# Patient Record
Sex: FEMALE | Race: WHITE | URBAN - METROPOLITAN AREA
[De-identification: names, ages, dates, MRNs, and addresses within clinical notes are randomized per-mention and may not be internally consistent; named-entity substitution may affect disease eponyms.]

---

## 2021-02-17 ENCOUNTER — APPOINTMENT (RX ONLY)
Dept: URBAN - METROPOLITAN AREA CLINIC 150 | Facility: CLINIC | Age: 71
Setting detail: DERMATOLOGY
End: 2021-02-17

## 2021-02-17 DIAGNOSIS — D485 NEOPLASM OF UNCERTAIN BEHAVIOR OF SKIN: ICD-10-CM

## 2021-02-17 DIAGNOSIS — L82.1 OTHER SEBORRHEIC KERATOSIS: ICD-10-CM

## 2021-02-17 DIAGNOSIS — L81.4 OTHER MELANIN HYPERPIGMENTATION: ICD-10-CM

## 2021-02-17 DIAGNOSIS — L57.0 ACTINIC KERATOSIS: ICD-10-CM

## 2021-02-17 PROBLEM — D48.5 NEOPLASM OF UNCERTAIN BEHAVIOR OF SKIN: Status: ACTIVE | Noted: 2021-02-17

## 2021-02-17 PROCEDURE — ? FULL BODY SKIN EXAM

## 2021-02-17 PROCEDURE — ? OTHER

## 2021-02-17 PROCEDURE — 17000 DESTRUCT PREMALG LESION: CPT

## 2021-02-17 PROCEDURE — ? COUNSELING

## 2021-02-17 PROCEDURE — ? LIQUID NITROGEN

## 2021-02-17 PROCEDURE — 17003 DESTRUCT PREMALG LES 2-14: CPT

## 2021-02-17 PROCEDURE — ? ADDITIONAL NOTES

## 2021-02-17 PROCEDURE — 99203 OFFICE O/P NEW LOW 30 MIN: CPT | Mod: 25

## 2021-02-17 ASSESSMENT — LOCATION SIMPLE DESCRIPTION DERM
LOCATION SIMPLE: LEFT POSTERIOR UPPER ARM
LOCATION SIMPLE: LEFT SUPERIOR EYELID
LOCATION SIMPLE: RIGHT LOWER BACK
LOCATION SIMPLE: RIGHT POSTERIOR UPPER ARM
LOCATION SIMPLE: UPPER BACK
LOCATION SIMPLE: CHEST
LOCATION SIMPLE: LEFT UPPER BACK

## 2021-02-17 ASSESSMENT — LOCATION DETAILED DESCRIPTION DERM
LOCATION DETAILED: SUPERIOR THORACIC SPINE
LOCATION DETAILED: LEFT SUPERIOR UPPER BACK
LOCATION DETAILED: UPPER STERNUM
LOCATION DETAILED: STERNAL NOTCH
LOCATION DETAILED: RIGHT DISTAL POSTERIOR UPPER ARM
LOCATION DETAILED: RIGHT INFERIOR MEDIAL MIDBACK
LOCATION DETAILED: MIDDLE STERNUM
LOCATION DETAILED: LEFT SUPRATARSAL CREASE
LOCATION DETAILED: LEFT DISTAL POSTERIOR UPPER ARM

## 2021-02-17 ASSESSMENT — LOCATION ZONE DERM
LOCATION ZONE: ARM
LOCATION ZONE: EYELID
LOCATION ZONE: TRUNK

## 2021-02-17 NOTE — PROCEDURE: LIQUID NITROGEN
Consent: The patient's consent was obtained including but not limited to risks of crusting, scabbing, blistering, scarring, darker or lighter pigmentary change, recurrence, incomplete removal and infection.
Render Note In Bullet Format When Appropriate: No
Post-Care Instructions: I reviewed with the patient in detail post-care instructions. Patient is to wear sunprotection, and avoid picking at any of the treated lesions. Pt may apply Vaseline to crusted or scabbing areas.
Duration Of Freeze Thaw-Cycle (Seconds): 10
Number Of Freeze-Thaw Cycles: 2 freeze-thaw cycles
Detail Level: Detailed

## 2021-02-17 NOTE — PROCEDURE: OTHER
Detail Level: Zone
Other (Free Text): Monitor area if no improvement should see 
Render Risk Assessment In Note?: no
Note Text (......Xxx Chief Complaint.): This diagnosis correlates with the

## 2022-01-24 ENCOUNTER — APPOINTMENT (RX ONLY)
Dept: URBAN - METROPOLITAN AREA CLINIC 150 | Facility: CLINIC | Age: 72
Setting detail: DERMATOLOGY
End: 2022-01-24

## 2022-01-24 DIAGNOSIS — L82.1 OTHER SEBORRHEIC KERATOSIS: ICD-10-CM

## 2022-01-24 DIAGNOSIS — L81.4 OTHER MELANIN HYPERPIGMENTATION: ICD-10-CM

## 2022-01-24 DIAGNOSIS — D18.0 HEMANGIOMA: ICD-10-CM

## 2022-01-24 DIAGNOSIS — D22 MELANOCYTIC NEVI: ICD-10-CM

## 2022-01-24 DIAGNOSIS — D485 NEOPLASM OF UNCERTAIN BEHAVIOR OF SKIN: ICD-10-CM

## 2022-01-24 DIAGNOSIS — L57.0 ACTINIC KERATOSIS: ICD-10-CM

## 2022-01-24 DIAGNOSIS — L56.8 OTHER SPECIFIED ACUTE SKIN CHANGES DUE TO ULTRAVIOLET RADIATION: ICD-10-CM

## 2022-01-24 PROBLEM — D48.5 NEOPLASM OF UNCERTAIN BEHAVIOR OF SKIN: Status: ACTIVE | Noted: 2022-01-24

## 2022-01-24 PROBLEM — D18.01 HEMANGIOMA OF SKIN AND SUBCUTANEOUS TISSUE: Status: ACTIVE | Noted: 2022-01-24

## 2022-01-24 PROBLEM — D22.9 MELANOCYTIC NEVI, UNSPECIFIED: Status: ACTIVE | Noted: 2022-01-24

## 2022-01-24 PROCEDURE — ? LIQUID NITROGEN

## 2022-01-24 PROCEDURE — 17003 DESTRUCT PREMALG LES 2-14: CPT

## 2022-01-24 PROCEDURE — ? FULL BODY SKIN EXAM

## 2022-01-24 PROCEDURE — ? ADDITIONAL NOTES

## 2022-01-24 PROCEDURE — 17000 DESTRUCT PREMALG LESION: CPT | Mod: 59

## 2022-01-24 PROCEDURE — ? BIOPSY BY SHAVE METHOD

## 2022-01-24 PROCEDURE — ? RECOMMENDATIONS

## 2022-01-24 PROCEDURE — 99213 OFFICE O/P EST LOW 20 MIN: CPT | Mod: 25

## 2022-01-24 PROCEDURE — ? TREATMENT REGIMEN

## 2022-01-24 PROCEDURE — 11102 TANGNTL BX SKIN SINGLE LES: CPT

## 2022-01-24 PROCEDURE — ? COUNSELING

## 2022-01-24 ASSESSMENT — LOCATION DETAILED DESCRIPTION DERM
LOCATION DETAILED: LEFT PROXIMAL RADIAL DORSAL FOREARM
LOCATION DETAILED: INFERIOR THORACIC SPINE
LOCATION DETAILED: LEFT POPLITEAL SKIN
LOCATION DETAILED: UPPER STERNUM
LOCATION DETAILED: RIGHT SUPERIOR LATERAL UPPER BACK
LOCATION DETAILED: RIGHT KNEE
LOCATION DETAILED: LEFT INFERIOR MEDIAL UPPER BACK
LOCATION DETAILED: RIGHT SUPERIOR UPPER BACK
LOCATION DETAILED: RIGHT INFERIOR MEDIAL UPPER BACK
LOCATION DETAILED: RIGHT SUPERIOR MEDIAL UPPER BACK
LOCATION DETAILED: RIGHT ANTERIOR DISTAL UPPER ARM
LOCATION DETAILED: LEFT MEDIAL SUPERIOR CHEST

## 2022-01-24 ASSESSMENT — LOCATION SIMPLE DESCRIPTION DERM
LOCATION SIMPLE: UPPER BACK
LOCATION SIMPLE: LEFT UPPER BACK
LOCATION SIMPLE: RIGHT KNEE
LOCATION SIMPLE: LEFT POPLITEAL SKIN
LOCATION SIMPLE: RIGHT BACK
LOCATION SIMPLE: RIGHT UPPER ARM
LOCATION SIMPLE: CHEST
LOCATION SIMPLE: LEFT FOREARM
LOCATION SIMPLE: RIGHT UPPER BACK

## 2022-01-24 ASSESSMENT — LOCATION ZONE DERM
LOCATION ZONE: ARM
LOCATION ZONE: LEG
LOCATION ZONE: TRUNK

## 2022-01-24 NOTE — PROCEDURE: LIQUID NITROGEN
Detail Level: Detailed
Show Applicator Variable?: Yes
Application Tool (Optional): Liquid Nitrogen Sprayer
Post-Care Instructions: I reviewed with the patient in detail post-care instructions. Patient is to wear sunprotection, and avoid picking at any of the treated lesions. Pt may apply Vaseline to crusted or scabbing areas.
Duration Of Freeze Thaw-Cycle (Seconds): 0
Render Note In Bullet Format When Appropriate: No
Consent: The patient's consent was obtained including but not limited to risks of crusting, scabbing, blistering, scarring, darker or lighter pigmentary change, recurrence, incomplete removal and infection.

## 2022-01-24 NOTE — PROCEDURE: TREATMENT REGIMEN
Detail Level: Generalized
Initiate Treatment: Advised to use sunscreen of SPF 30 or greater to sun exposed areas

## 2022-01-24 NOTE — PROCEDURE: BIOPSY BY SHAVE METHOD
Detail Level: Detailed
Depth Of Biopsy: dermis
Was A Bandage Applied: Yes
Size Of Lesion In Cm: 0
Anticipated Plan (Based On Presumed Biopsy Results): ED&Cx3 if positive
Biopsy Type: H and E
Biopsy Method: Dermablade
Anesthesia Type: 1% lidocaine with epinephrine
Anesthesia Volume In Cc (Will Not Render If 0): 0.5
Hemostasis: Drysol
Wound Care: Petrolatum
Dressing: bandage
Destruction After The Procedure: No
Type Of Destruction Used: Curettage
Curettage Text: The wound bed was treated with curettage after the biopsy was performed.
Cryotherapy Text: The wound bed was treated with cryotherapy after the biopsy was performed.
Electrodesiccation Text: The wound bed was treated with electrodesiccation after the biopsy was performed.
Electrodesiccation And Curettage Text: The wound bed was treated with electrodesiccation and curettage after the biopsy was performed.
Silver Nitrate Text: The wound bed was treated with silver nitrate after the biopsy was performed.
Lab: 6
Lab Facility: [id number]
Consent: Written consent was obtained and risks were reviewed including but not limited to scarring, infection, bleeding, scabbing, incomplete removal, nerve damage and allergy to anesthesia.
Post-Care Instructions: I reviewed with the patient in detail post-care instructions. Patient is to keep the biopsy site dry overnight, and then apply bacitracin twice daily until healed. Patient may apply hydrogen peroxide soaks to remove any crusting.
Notification Instructions: Patient will be notified of biopsy results. However, patient instructed to call the office if not contacted within 2 weeks.
Billing Type: Third-Party Bill
Information: Selecting Yes will display possible errors in your note based on the variables you have selected. This validation is only offered as a suggestion for you. PLEASE NOTE THAT THE VALIDATION TEXT WILL BE REMOVED WHEN YOU FINALIZE YOUR NOTE. IF YOU WANT TO FAX A PRELIMINARY NOTE YOU WILL NEED TO TOGGLE THIS TO 'NO' IF YOU DO NOT WANT IT IN YOUR FAXED NOTE.

## 2022-02-07 ENCOUNTER — APPOINTMENT (RX ONLY)
Dept: URBAN - METROPOLITAN AREA CLINIC 150 | Facility: CLINIC | Age: 72
Setting detail: DERMATOLOGY
End: 2022-02-07

## 2022-02-07 PROBLEM — C44.519 BASAL CELL CARCINOMA OF SKIN OF OTHER PART OF TRUNK: Status: ACTIVE | Noted: 2022-02-07

## 2022-02-07 PROCEDURE — ? CURETTAGE AND DESTRUCTION

## 2022-02-07 PROCEDURE — 17263 DSTRJ MAL LES T/A/L 2.1-3.0: CPT

## 2022-02-07 PROCEDURE — ? COUNSELING

## 2022-02-07 NOTE — PROCEDURE: CURETTAGE AND DESTRUCTION
Additional Information: (Optional): The wound was cleaned, and a pressure dressing was applied.  The patient received detailed post-op instructions.Lesion was debulked with dermablade

## 2023-02-28 ENCOUNTER — APPOINTMENT (RX ONLY)
Dept: URBAN - METROPOLITAN AREA CLINIC 150 | Facility: CLINIC | Age: 73
Setting detail: DERMATOLOGY
End: 2023-02-28

## 2023-02-28 DIAGNOSIS — L81.4 OTHER MELANIN HYPERPIGMENTATION: ICD-10-CM

## 2023-02-28 DIAGNOSIS — B37.2 CANDIDIASIS OF SKIN AND NAIL: ICD-10-CM | Status: INADEQUATELY CONTROLLED

## 2023-02-28 DIAGNOSIS — D22 MELANOCYTIC NEVI: ICD-10-CM

## 2023-02-28 DIAGNOSIS — D18.0 HEMANGIOMA: ICD-10-CM

## 2023-02-28 DIAGNOSIS — Z85.828 PERSONAL HISTORY OF OTHER MALIGNANT NEOPLASM OF SKIN: ICD-10-CM

## 2023-02-28 DIAGNOSIS — L82.1 OTHER SEBORRHEIC KERATOSIS: ICD-10-CM

## 2023-02-28 DIAGNOSIS — L56.8 OTHER SPECIFIED ACUTE SKIN CHANGES DUE TO ULTRAVIOLET RADIATION: ICD-10-CM

## 2023-02-28 DIAGNOSIS — L57.0 ACTINIC KERATOSIS: ICD-10-CM | Status: INADEQUATELY CONTROLLED

## 2023-02-28 PROBLEM — D18.01 HEMANGIOMA OF SKIN AND SUBCUTANEOUS TISSUE: Status: ACTIVE | Noted: 2023-02-28

## 2023-02-28 PROBLEM — D22.9 MELANOCYTIC NEVI, UNSPECIFIED: Status: ACTIVE | Noted: 2023-02-28

## 2023-02-28 PROCEDURE — 17000 DESTRUCT PREMALG LESION: CPT

## 2023-02-28 PROCEDURE — ? FULL BODY SKIN EXAM

## 2023-02-28 PROCEDURE — ? RECOMMENDATIONS

## 2023-02-28 PROCEDURE — ? TREATMENT REGIMEN

## 2023-02-28 PROCEDURE — ? PRESCRIPTION MEDICATION MANAGEMENT

## 2023-02-28 PROCEDURE — ? COUNSELING

## 2023-02-28 PROCEDURE — ? PRESCRIPTION

## 2023-02-28 PROCEDURE — 99213 OFFICE O/P EST LOW 20 MIN: CPT | Mod: 25

## 2023-02-28 PROCEDURE — ? ADDITIONAL NOTES

## 2023-02-28 PROCEDURE — ? LIQUID NITROGEN

## 2023-02-28 RX ORDER — KETOCONAZOLE 20 MG/G
CREAM TOPICAL BID
Qty: 60 | Refills: 3 | Status: ERX | COMMUNITY
Start: 2023-02-28

## 2023-02-28 RX ADMIN — KETOCONAZOLE: 20 CREAM TOPICAL at 00:00

## 2023-02-28 ASSESSMENT — LOCATION SIMPLE DESCRIPTION DERM
LOCATION SIMPLE: RIGHT UPPER BACK
LOCATION SIMPLE: LEFT POPLITEAL SKIN
LOCATION SIMPLE: RIGHT KNEE
LOCATION SIMPLE: UPPER BACK
LOCATION SIMPLE: LEFT FOREARM
LOCATION SIMPLE: RIGHT UPPER BACK
LOCATION SIMPLE: LEFT UPPER BACK
LOCATION SIMPLE: CHEST
LOCATION SIMPLE: RIGHT BREAST
LOCATION SIMPLE: NOSE
LOCATION SIMPLE: RIGHT UPPER ARM
LOCATION SIMPLE: LEFT NOSE

## 2023-02-28 ASSESSMENT — LOCATION DETAILED DESCRIPTION DERM
LOCATION DETAILED: RIGHT SUPERIOR UPPER BACK
LOCATION DETAILED: LEFT POPLITEAL SKIN
LOCATION DETAILED: LEFT NASAL SIDEWALL
LOCATION DETAILED: RIGHT SUPERIOR MEDIAL UPPER BACK
LOCATION DETAILED: RIGHT INFERIOR MEDIAL UPPER BACK
LOCATION DETAILED: RIGHT ANTERIOR DISTAL UPPER ARM
LOCATION DETAILED: RIGHT INFRAMAMMARY CREASE (INNER QUADRANT)
LOCATION DETAILED: LEFT INFERIOR MEDIAL UPPER BACK
LOCATION DETAILED: INFERIOR THORACIC SPINE
LOCATION DETAILED: RIGHT KNEE
LOCATION DETAILED: NASAL DORSUM
LOCATION DETAILED: LEFT PROXIMAL RADIAL DORSAL FOREARM
LOCATION DETAILED: LEFT MEDIAL SUPERIOR CHEST

## 2023-02-28 ASSESSMENT — LOCATION ZONE DERM
LOCATION ZONE: ARM
LOCATION ZONE: TRUNK
LOCATION ZONE: TRUNK
LOCATION ZONE: LEG
LOCATION ZONE: NOSE

## 2023-02-28 NOTE — PROCEDURE: PRESCRIPTION MEDICATION MANAGEMENT
Detail Level: Zone
Render In Strict Bullet Format?: No
Initiate Treatment: ketoconazole 2 % topical cream BID\\nQuantity: 60.0 g  Days Supply: 30\\nSig: Apply twice a day to the area for four weeks.

## 2023-02-28 NOTE — PROCEDURE: LIQUID NITROGEN
Render Post-Care Instructions In Note?: no
Number Of Freeze-Thaw Cycles: 1 freeze-thaw cycle
Application Tool (Optional): Liquid Nitrogen Sprayer
Consent: The patient's consent was obtained including but not limited to risks of crusting, scabbing, blistering, scarring, darker or lighter pigmentary change, recurrence, incomplete removal and infection.
Show Aperture Variable?: Yes
Detail Level: Detailed
Duration Of Freeze Thaw-Cycle (Seconds): 10
Post-Care Instructions: I reviewed with the patient in detail post-care instructions. Patient is to wear sunprotection, and avoid picking at any of the treated lesions. Pt may apply Vaseline to crusted or scabbing areas.

## 2023-05-04 ENCOUNTER — OFFICE VISIT (OUTPATIENT)
Dept: OBGYN CLINIC | Facility: CLINIC | Age: 73
End: 2023-05-04

## 2023-05-04 VITALS
HEIGHT: 66 IN | DIASTOLIC BLOOD PRESSURE: 82 MMHG | WEIGHT: 207 LBS | SYSTOLIC BLOOD PRESSURE: 122 MMHG | BODY MASS INDEX: 33.27 KG/M2

## 2023-05-04 DIAGNOSIS — Z12.31 ENCOUNTER FOR SCREENING MAMMOGRAM FOR MALIGNANT NEOPLASM OF BREAST: ICD-10-CM

## 2023-05-04 DIAGNOSIS — Z01.419 ENCNTR FOR GYN EXAM (GENERAL) (ROUTINE) W/O ABN FINDINGS: Primary | ICD-10-CM

## 2023-05-04 RX ORDER — SIMVASTATIN 20 MG
TABLET ORAL
COMMUNITY
Start: 2023-02-28

## 2023-05-04 RX ORDER — METOPROLOL SUCCINATE 25 MG/1
TABLET, EXTENDED RELEASE ORAL
COMMUNITY
Start: 2023-02-19

## 2023-05-04 RX ORDER — ESCITALOPRAM OXALATE 20 MG/1
TABLET ORAL
COMMUNITY
Start: 2023-04-21

## 2023-05-04 RX ORDER — MECLIZINE HCL 25MG 25 MG/1
32 TABLET, CHEWABLE ORAL 3 TIMES DAILY PRN
COMMUNITY

## 2023-05-04 RX ORDER — LEVOTHYROXINE SODIUM 0.05 MG/1
TABLET ORAL
COMMUNITY
Start: 2023-04-15

## 2023-05-04 RX ORDER — ONDANSETRON 4 MG/1
4 TABLET, ORALLY DISINTEGRATING ORAL EVERY 8 HOURS PRN
COMMUNITY

## 2023-05-04 RX ORDER — KETOCONAZOLE 20 MG/G
CREAM TOPICAL
COMMUNITY
Start: 2023-02-28

## 2023-05-04 RX ORDER — ZOLPIDEM TARTRATE 6.25 MG/1
6.25 TABLET, FILM COATED, EXTENDED RELEASE ORAL
COMMUNITY
Start: 2023-02-28

## 2023-05-04 NOTE — PROGRESS NOTES
Medicare [address] [address], [address], [facility] [address] [facility]    ASSESSMENT/PLAN: Javy Lieberman is a 75 y o   who presents for [facility] gynecologic wellness exam     Encounter for routine gynecologic examination  - Routine well woman exam completed today  - Cervical Cancer Screening complete, no further screening necessary   - Breast Cancer Screening: Last Mammogram Not on file,   - Colorectal cancer screening last up to date  - Osteoporosis screening up to date, follows with PCP  - The following were reviewed in today's visit: breast self exam     Discussed with patient Medicare (and plans that act like Medicare) pay for wellness visit q 2 yrs - but patient may return for problems as needed  Recommend annual breast exam and mammogram   If not seen by our office on her off year, she can still call and ask for a screening mammogram order and the nurses will provide one for her  Additional problems addressed during this visit:  1  Encntr for gyn exam (general) (routine) w/o abn findings    2  Encounter for screening mammogram for malignant neoplasm of breast  -     Mammo screening bilateral w 3d & cad; Future        Next visit: 2 yrs WA/PRN    CC:  Medicare Gynecologic Wellness Examination    HPI: Javy Lieberman is a 75 y o   who presents for [facility] gynecologic examination  Patient presents for Gyn exam   Denies having any concerns  Previous Gyn exam with [facility] 3 yrs ago  Gyn History       She  reports being sexually active and has had partner(s) who are male  She reports using the following method of birth control/protection: Post-menopausal        Past Medical History:  No date: Hypertension  No date: Hypothyroidism     Past Surgical History:  No date: BREAST BIOPSY  No date:  SECTION     History reviewed  No pertinent family history       Social History     Tobacco Use    Smoking status: Never     Passive exposure: Never    Smokeless "tobacco: Never   Vaping Use    Vaping Use: Never used   Substance Use Topics    Alcohol use: Not Currently    Drug use: Never          Current Outpatient Medications:     escitalopram (LEXAPRO) 20 mg tablet, , Disp: , Rfl:     levothyroxine 50 mcg tablet, , Disp: , Rfl:     Meclizine HCl 25 MG CHEW, Chew 32 mg Three times daily as needed, Disp: , Rfl:     metoprolol succinate (TOPROL-XL) 25 mg 24 hr tablet, , Disp: , Rfl:     ondansetron (ZOFRAN-ODT) 4 mg disintegrating tablet, Take 4 mg by mouth every 8 (eight) hours as needed, Disp: , Rfl:     simvastatin (ZOCOR) 20 mg tablet, , Disp: , Rfl:     zolpidem (AMBIEN CR) 6 25 MG CR tablet, Take 6 25 mg by mouth daily at bedtime, Disp: , Rfl:     ketoconazole (NIZORAL) 2 % cream, , Disp: , Rfl:     She has No Known Allergies       ROS negative except as noted in HPI    Objective:  /82 (BP Location: Left arm, Patient Position: Sitting, Cuff Size: Standard)   Ht 5' 6\" (1 676 m)   Wt 93 9 kg (207 lb)   BMI 33 41 kg/m²      Physical Exam  Vitals and nursing note reviewed  HENT:      Head: Normocephalic  Chest:   Breasts:     Breasts are symmetrical       Right: Normal  No bleeding, mass, nipple discharge, skin change or tenderness  Left: Normal  No bleeding, mass, nipple discharge, skin change or tenderness  Abdominal:      General: There is no distension  Palpations: Abdomen is soft  There is no mass  Tenderness: There is no abdominal tenderness  There is no rebound  Genitourinary:     General: Normal vulva  Exam position: Lithotomy position  Labia:         Right: No rash, tenderness or lesion  Left: No rash, tenderness or lesion  Urethra: No urethral pain or urethral lesion  Vagina: Normal  No vaginal discharge  Cervix: No discharge, friability, lesion or erythema  Uterus: Normal        Adnexa: Right adnexa normal and left adnexa normal       Rectum: No external hemorrhoid     Musculoskeletal: " Right lower leg: No edema  Left lower leg: No edema  Lymphadenopathy:      Upper Body:      Right upper body: No axillary or pectoral adenopathy  Left upper body: No axillary or pectoral adenopathy  Skin:     General: Skin is warm  Neurological:      Mental Status: She is alert and oriented to person, place, and time  Psychiatric:         Mood and Affect: Mood normal          Behavior: Behavior normal          Thought Content:  Thought content normal

## 2023-10-11 ENCOUNTER — TELEPHONE (OUTPATIENT)
Dept: NEUROLOGY | Facility: CLINIC | Age: 73
End: 2023-10-11

## 2023-10-17 ENCOUNTER — OFFICE VISIT (OUTPATIENT)
Dept: NEUROLOGY | Facility: CLINIC | Age: 73
End: 2023-10-17
Payer: MEDICARE

## 2023-10-17 VITALS
HEART RATE: 73 BPM | BODY MASS INDEX: 34.85 KG/M2 | OXYGEN SATURATION: 97 % | WEIGHT: 215.9 LBS | SYSTOLIC BLOOD PRESSURE: 128 MMHG | DIASTOLIC BLOOD PRESSURE: 74 MMHG

## 2023-10-17 DIAGNOSIS — G44.86 CERVICOGENIC HEADACHE: Primary | ICD-10-CM

## 2023-10-17 DIAGNOSIS — M48.02 CERVICAL SPINAL STENOSIS: ICD-10-CM

## 2023-10-17 DIAGNOSIS — I95.1 ORTHOSTATIC HYPOTENSION: ICD-10-CM

## 2023-10-17 PROCEDURE — G2212 PROLONG OUTPT/OFFICE VIS: HCPCS | Performed by: PSYCHIATRY & NEUROLOGY

## 2023-10-17 PROCEDURE — 99205 OFFICE O/P NEW HI 60 MIN: CPT | Performed by: PSYCHIATRY & NEUROLOGY

## 2023-10-17 RX ORDER — LORAZEPAM 0.5 MG/1
0.5 TABLET ORAL EVERY 8 HOURS PRN
COMMUNITY

## 2023-10-17 RX ORDER — GABAPENTIN 300 MG/1
300 CAPSULE ORAL
Qty: 30 CAPSULE | Refills: 3 | Status: SHIPPED | OUTPATIENT
Start: 2023-10-17

## 2023-10-17 NOTE — PROGRESS NOTES
Patient ID: Deedee Martinez is a 75 y.o. female. Assessment/Plan:    Cervicogenic headache  She most likely has cervicogenic headaches from cervical spondylosis. She complains of pain at the back of the head with tightness in her shoulders. Recommendations:  -Trial of gabapentin 300 mg nightly  -Okay to continue with chiropractor, however, avoid any cracking of the neck    Cervical spinal stenosis  She does have brisk reflexes on the right side. She has some sensory loss on the left side. She notes stress incontinence. We will follow. At this time, she is not weak and therefore we will hold on spine surgery consultation. Recommendations:  -Advised to reconsider assistive device    Orthostatic hypotension  Her BP was normal when she came in. When I rechecked it for orthostatic vital signs, it was significantly elevated supine with a 25 point drop on laying down to sitting. I will forward my note to Dr. Mckenna Paez to see if BP meds need to be adjusted. Of note, she stopped amlodipine and restarted metoprolol because she felt more dizzy on amlodipine. This  may be the source of her light headedness. Recommendations:  -supplement with Gatorade and multivitamin  -Will await carotid US ordered by cardiology  -She will f/u with cardiology regarding BP meds, in light ot orthostasis. Family history of Parkinsons:  Her brother has PD. Patient has some tremor but is not Parkinsonian on exam.     Thyroid nodule: Incidentally seen on MRI c spine. She has thyroid US scheduled. [facility] follow-up would be in 3 months, however, patient is leaving for [place] in mid December. Advised to follow-up with me prior to departing. Total time 1 hour and 14 minutes, including face-to-face time with the patient and time before all on the same day of the visit.     Subjective:    Mrs Jasmyne Philip is a 50-year-old right handed lady with PMH HTN, HLD, chronic vertigo/dizziness, mitral valve prolapse, here for new in person neurological consultation for possible vestibular migraine. She is here with her . She was referred by her cardiologist, Dr Dominik Flynn. At her last visit with cardiology in July 2023, she noted daily dizziness. Symptoms fluctuated. Some symptoms were worse after a shower. No improvement with improvement with blood pressure. At her last visit with cardiology, metoprolol was changed to amlodipine. She felt more dizzy. She stopped the amlodipine and switched back to metoprolol. She will follow up with cardiology in the next few weeks. She has not updated cardiology about the med change. In 2019, she was in [place] for the winter. She leaned over to pet a cat and passed out. She hit her head on a table. A few months later, she had an acute episode of vertigo. She managed through it. A few months later, she had another episode of vertigo which brought to the ER in [place]. She had CT head which was unrevealing. 6 months later, she had a 3rd event in 2021. ER at local hospital transferred to another hospital for admission. She notes multiple tests were done and no etiology was found. After all of these episodes her balance worsened. She has seen several neurologists, most recently Dr Quincy Antoine in [place]. He started her on escitalopram. This has actually been helpful with the vertigo. It has helped her anxiety as well. She saw [facility]NT and was dx with vestibular migraines. The dizziness occurs with changes in barometric pressure. After flying, it takes her a few days to equilibrate. New PCP ordered MRI C spine. She has moderate spinal stenosis and foraminal stenosis. She is still waking up with nausea and vertigo daily. She attends vestibular therapy. It has helped with her balance. She was wiped on the weekend when it rained. Patient notes she is light headed. She has had a posterior headache and neck pain.  She feels dizzy - she feels like she needs to sit or lay down. She has not had not had any spinning sensation since starting the escitalopram. +nausea not necessarily with the headache. When very dizzy she has nausea. She has tinnitus. She fell last week. She does not use a cane or a walker, although she tells me it was recommended by PT. She holds on to her  when she walks. She does not drink coffee. She drinks about 36 oz of water per day. Her brother has Parkinsons. She is worried that her shakiness is Parkinsons too, but acknowledges that her tremor does not look like her brother's tremor. On occasion, when drink water, she coughs. Objective:    Blood pressure 128/74, pulse 73, weight 97.9 kg (215 lb 14.4 oz), SpO2 97 %. Orthostatic vital signs  Supine HR 65, /77  Sitting HR 58, /69  Standing HR 63, /63    Physical Exam  Constitutional:       Appearance: Normal appearance. HENT:      Mouth/Throat:      Mouth: Mucous membranes are moist.      Pharynx: Oropharynx is clear. Eyes:      Conjunctiva/sclera: Conjunctivae normal.   Neck:      Vascular: No carotid bruit. Cardiovascular:      Rate and Rhythm: Normal rate and regular rhythm. Pulmonary:      Effort: Pulmonary effort is normal.      Breath sounds: Normal breath sounds. Psychiatric:         Mood and Affect: Mood normal.         Behavior: Behavior normal.         Neurological Exam  Mental status: Awake, alert, oriented to person, place and time. Naming, knowledge, repetition, concentration and naming intact. Cranial nerves: Extraocular movements intact. Pupils equally round and reactive to light. Visual fields full to confrontation. Facial sensation intact. Face symmetric. Nonsustained gaze evoked nystagmus to the left side. Hearing intact. Tongue, uvula, palate midline and intact. Sternocleidomastoid intact. Motor: Strength 5/5 in all 4 extremities. Normal tone. Rapid alternating movements symmetric. Postural tremor.  No resting stenosis. Severe right and moderate to severe left foraminal stenosis. C5-C6: Severe disc space narrowing and desiccation. Broad-based disc osteophyte complex compressing the ventral spinal cord. Moderate spinal canal stenosis. Severe bilateral foraminal stenosis. C6-C7: Severe disc base narrowing and desiccation. Broad based left paracentral disc osteophyte complex compressing the ventral spinal cord. Moderate spinal canal stenosis. Severe bilateral foraminal stenosis. Left thyroid nodule measuring at least 2 cm.

## 2023-10-17 NOTE — ASSESSMENT & PLAN NOTE
Her BP was normal when she came in. When I rechecked it for orthostatic vital signs, it was significantly elevated supine with a 25 point drop on laying down to sitting. I will forward my note to Dr. Jarrod Moran to see if BP meds need to be adjusted. Of note, she stopped amlodipine and restarted metoprolol because she felt more dizzy on amlodipine. This  may be the source of her light headedness. Recommendations:  -supplement with Gatorade and multivitamin  -Will await carotid US ordered by cardiology  -She will f/u with cardiology regarding BP meds, in light ot orthostasis.

## 2023-10-17 NOTE — ASSESSMENT & PLAN NOTE
She most likely has cervicogenic headaches from cervical spondylosis. She complains of pain at the back of the head with tightness in her shoulders.     Recommendations:  -Trial of gabapentin 300 mg nightly  -Okay to continue with chiropractor, however, avoid any cracking of the neck

## 2023-10-17 NOTE — ASSESSMENT & PLAN NOTE
She does have brisk reflexes on the right side. She has some sensory loss on the left side. She notes stress incontinence. We will follow. At this time, she is not weak and therefore we will hold on spine surgery consultation.     Recommendations:  -Advised to reconsider assistive device

## 2023-10-25 ENCOUNTER — PATIENT MESSAGE (OUTPATIENT)
Dept: NEUROLOGY | Facility: CLINIC | Age: 73
End: 2023-10-25

## 2023-11-27 ENCOUNTER — TELEPHONE (OUTPATIENT)
Dept: NEUROLOGY | Facility: CLINIC | Age: 73
End: 2023-11-27

## 2023-11-27 NOTE — TELEPHONE ENCOUNTER
LMOM for pt re appt with Dr Saadia Sun 12/4 @ 1:30 pm in [facility].

## 2023-12-05 ENCOUNTER — TELEPHONE (OUTPATIENT)
Dept: NEUROLOGY | Facility: CLINIC | Age: 73
End: 2023-12-05

## 2023-12-05 NOTE — TELEPHONE ENCOUNTER
Request faxed to Dr Cyrus Colindres office, pt's cardiologist @ [phone] to have office notes and carotid u/s faxed to us, will await records.

## 2023-12-05 NOTE — TELEPHONE ENCOUNTER
----- Message from Za Weaver MD sent at 12/1/2023  3:10 PM EST -----  Regarding: need records  Sean Denise,    I am seeing this patient on Monday. Could we get most recent cardiology note and carotid ultrasound done at her cardiologist's office? Her cardiologist is Alley Martin at [facility] in [place]. Thanks!   RC

## 2023-12-22 ENCOUNTER — TELEPHONE (OUTPATIENT)
Dept: NEUROLOGY | Facility: CLINIC | Age: 73
End: 2023-12-22

## 2023-12-22 NOTE — TELEPHONE ENCOUNTER
Regarding: RE: Makenzie [facility] Spine  Contact: [phone]  Camelia,    This was the patient who had to cancel her virtual appt last week because she went to the ER. I have openings on 12/21. Please schedule.     Thanks!      ----- Message -----  From: Danae Jackson RN  Sent: 12/11/2023   8:51 AM EST  To: Leanna Coffey MD  Subject: FW: Makenzie [facility] Spine                          ----- Message -----  From: Makenzie Ellis  Sent: 12/9/2023  12:39 PM EST  To: [facility] Clinical Team 5  Subject: Makenzie [facility] Spine                            Hi Dr. Coffey - Thank you for your inquiry. I am sorry we weren't able to participate in the virtual visit. Makenzie had a severe coughing spell where she couldn't catch her breath on Monday. After having fought the upper respiratory for over 2.5 weeks, and having completed both antibiotic and steroid prescriptions, I thought it was time to call EMS. She was subsequently admitted to [facility] on Monday afternoon. Her condition had improved by Wednesday and they had discussed discharge. Unfortunately she went into A-fibb and a whole new team of cardiologist specialists entered the fray. She was in A-fibb for approximately 12 hours and then came out of it as the result of the medications she was given. Long story short - she was discharged late yesterday and is now home resting. Her arms a black and blue from them taking blood almost 20 times over the 5 days. She is weak but in good spirits. I pushed our flight back to 12/18. Maybe we can do a virtual  visit late next week?   Shon

## 2023-12-22 NOTE — TELEPHONE ENCOUNTER
Call to pt-LMOM , if she is still interested in doing the virtual visit Dr Coffey has openings on 1/16, advised to call back and let us know if this day will work for her.

## 2023-12-27 ENCOUNTER — TELEPHONE (OUTPATIENT)
Dept: NEUROLOGY | Facility: CLINIC | Age: 73
End: 2023-12-27

## 2023-12-27 NOTE — TELEPHONE ENCOUNTER
Pts  called in to reschedule missed f/u appt. Unfortunately Pt is currently in [place] until then end of April so I made him aware that we can't do virtual appointments if the pt is out of the state.   We did make a f/u for 5/9/24 @ 1:00 with Dr. Coffey in [place]. Pt  will reach out via [employer] with any concerns.

## 2024-01-29 DIAGNOSIS — G44.86 CERVICOGENIC HEADACHE: ICD-10-CM

## 2024-01-29 DIAGNOSIS — M48.02 CERVICAL SPINAL STENOSIS: ICD-10-CM

## 2024-01-29 RX ORDER — GABAPENTIN 300 MG/1
300 CAPSULE ORAL
Qty: 90 CAPSULE | Refills: 1 | Status: SHIPPED | OUTPATIENT
Start: 2024-01-29

## 2024-02-08 ENCOUNTER — APPOINTMENT (RX ONLY)
Dept: URBAN - METROPOLITAN AREA CLINIC 150 | Facility: CLINIC | Age: 74
Setting detail: DERMATOLOGY
End: 2024-02-08

## 2024-02-08 DIAGNOSIS — L57.0 ACTINIC KERATOSIS: ICD-10-CM

## 2024-02-08 DIAGNOSIS — L81.4 OTHER MELANIN HYPERPIGMENTATION: ICD-10-CM

## 2024-02-08 DIAGNOSIS — L90.5 SCAR CONDITIONS AND FIBROSIS OF SKIN: ICD-10-CM

## 2024-02-08 DIAGNOSIS — D18.0 HEMANGIOMA: ICD-10-CM

## 2024-02-08 DIAGNOSIS — D22 MELANOCYTIC NEVI: ICD-10-CM

## 2024-02-08 DIAGNOSIS — Q82.5 CONGENITAL NON-NEOPLASTIC NEVUS: ICD-10-CM

## 2024-02-08 DIAGNOSIS — L56.8 OTHER SPECIFIED ACUTE SKIN CHANGES DUE TO ULTRAVIOLET RADIATION: ICD-10-CM

## 2024-02-08 DIAGNOSIS — I83.9 ASYMPTOMATIC VARICOSE VEINS OF LOWER EXTREMITIES: ICD-10-CM

## 2024-02-08 DIAGNOSIS — L82.1 OTHER SEBORRHEIC KERATOSIS: ICD-10-CM

## 2024-02-08 DIAGNOSIS — Z85.828 PERSONAL HISTORY OF OTHER MALIGNANT NEOPLASM OF SKIN: ICD-10-CM | Status: RESOLVED

## 2024-02-08 PROBLEM — D22.5 MELANOCYTIC NEVI OF TRUNK: Status: ACTIVE | Noted: 2024-02-08

## 2024-02-08 PROBLEM — I83.91 ASYMPTOMATIC VARICOSE VEINS OF RIGHT LOWER EXTREMITY: Status: ACTIVE | Noted: 2024-02-08

## 2024-02-08 PROBLEM — D22.61 MELANOCYTIC NEVI OF RIGHT UPPER LIMB, INCLUDING SHOULDER: Status: ACTIVE | Noted: 2024-02-08

## 2024-02-08 PROBLEM — D22.62 MELANOCYTIC NEVI OF LEFT UPPER LIMB, INCLUDING SHOULDER: Status: ACTIVE | Noted: 2024-02-08

## 2024-02-08 PROBLEM — D18.01 HEMANGIOMA OF SKIN AND SUBCUTANEOUS TISSUE: Status: ACTIVE | Noted: 2024-02-08

## 2024-02-08 PROBLEM — D22.71 MELANOCYTIC NEVI OF RIGHT LOWER LIMB, INCLUDING HIP: Status: ACTIVE | Noted: 2024-02-08

## 2024-02-08 PROBLEM — D23.71 OTHER BENIGN NEOPLASM OF SKIN OF RIGHT LOWER LIMB, INCLUDING HIP: Status: ACTIVE | Noted: 2024-02-08

## 2024-02-08 PROBLEM — D22.72 MELANOCYTIC NEVI OF LEFT LOWER LIMB, INCLUDING HIP: Status: ACTIVE | Noted: 2024-02-08

## 2024-02-08 PROCEDURE — 99213 OFFICE O/P EST LOW 20 MIN: CPT | Mod: 25

## 2024-02-08 PROCEDURE — ? COUNSELING

## 2024-02-08 PROCEDURE — 17003 DESTRUCT PREMALG LES 2-14: CPT

## 2024-02-08 PROCEDURE — ? TREATMENT REGIMEN

## 2024-02-08 PROCEDURE — 17000 DESTRUCT PREMALG LESION: CPT

## 2024-02-08 PROCEDURE — ? LIQUID NITROGEN

## 2024-02-08 PROCEDURE — ? FULL BODY SKIN EXAM

## 2024-02-08 ASSESSMENT — LOCATION DETAILED DESCRIPTION DERM
LOCATION DETAILED: RIGHT ANTERIOR PROXIMAL THIGH
LOCATION DETAILED: LEFT SUPERIOR UPPER BACK
LOCATION DETAILED: RIGHT SUPERIOR MEDIAL UPPER BACK
LOCATION DETAILED: RIGHT INFERIOR LATERAL MIDBACK
LOCATION DETAILED: RIGHT DISTAL CALF
LOCATION DETAILED: LEFT ANTERIOR PROXIMAL THIGH
LOCATION DETAILED: LEFT MEDIAL SUPERIOR CHEST
LOCATION DETAILED: LEFT DISTAL POSTERIOR UPPER ARM
LOCATION DETAILED: LEFT DISTAL RADIAL DORSAL FOREARM
LOCATION DETAILED: MID POSTERIOR NECK
LOCATION DETAILED: RIGHT MEDIAL BREAST 1-2:00 REGION
LOCATION DETAILED: RIGHT RIB CAGE
LOCATION DETAILED: RIGHT KNEE
LOCATION DETAILED: RIGHT ANTERIOR DISTAL UPPER ARM
LOCATION DETAILED: LEFT INFERIOR MEDIAL UPPER BACK
LOCATION DETAILED: RIGHT POSTERIOR SHOULDER
LOCATION DETAILED: RIGHT PROXIMAL PRETIBIAL REGION
LOCATION DETAILED: RIGHT ANTERIOR DISTAL THIGH
LOCATION DETAILED: RIGHT LATERAL SUPERIOR CHEST
LOCATION DETAILED: RIGHT PROXIMAL POSTERIOR UPPER ARM
LOCATION DETAILED: SUPERIOR THORACIC SPINE
LOCATION DETAILED: LEFT INFERIOR LATERAL MIDBACK
LOCATION DETAILED: RIGHT LATERAL ABDOMEN
LOCATION DETAILED: RIGHT SUPERIOR LATERAL MIDBACK
LOCATION DETAILED: LEFT CENTRAL TEMPLE
LOCATION DETAILED: LEFT LATERAL SUPERIOR CHEST
LOCATION DETAILED: SUBXIPHOID
LOCATION DETAILED: RIGHT DISTAL PRETIBIAL REGION
LOCATION DETAILED: LEFT ANTERIOR SHOULDER
LOCATION DETAILED: LEFT MEDIAL PROXIMAL PRETIBIAL REGION
LOCATION DETAILED: LEFT PROXIMAL PRETIBIAL REGION
LOCATION DETAILED: RIGHT MEDIAL SUPERIOR CHEST
LOCATION DETAILED: RIGHT SUPERIOR LATERAL UPPER BACK
LOCATION DETAILED: SUPERIOR MID FOREHEAD
LOCATION DETAILED: LEFT PROXIMAL RADIAL DORSAL FOREARM
LOCATION DETAILED: LEFT PROXIMAL CALF
LOCATION DETAILED: RIGHT ANTERIOR SHOULDER
LOCATION DETAILED: LEFT DISTAL POSTERIOR THIGH
LOCATION DETAILED: LEFT MID-UPPER BACK
LOCATION DETAILED: LEFT RIB CAGE
LOCATION DETAILED: INFERIOR THORACIC SPINE
LOCATION DETAILED: RIGHT DISTAL POSTERIOR THIGH
LOCATION DETAILED: LEFT ANTERIOR DISTAL THIGH
LOCATION DETAILED: LEFT PROXIMAL POSTERIOR UPPER ARM
LOCATION DETAILED: RIGHT MEDIAL TRAPEZIAL NECK
LOCATION DETAILED: EPIGASTRIC SKIN
LOCATION DETAILED: RIGHT INFERIOR CENTRAL MALAR CHEEK
LOCATION DETAILED: LEFT CENTRAL LATERAL NECK
LOCATION DETAILED: LEFT CENTRAL MALAR CHEEK
LOCATION DETAILED: RIGHT PROXIMAL DORSAL FOREARM
LOCATION DETAILED: PERIUMBILICAL SKIN
LOCATION DETAILED: LEFT SUPERIOR LATERAL UPPER BACK
LOCATION DETAILED: RIGHT PROXIMAL POSTERIOR THIGH
LOCATION DETAILED: LEFT PROXIMAL POSTERIOR THIGH
LOCATION DETAILED: LEFT POPLITEAL SKIN
LOCATION DETAILED: MIDDLE STERNUM
LOCATION DETAILED: UPPER STERNUM
LOCATION DETAILED: LEFT DISTAL PRETIBIAL REGION
LOCATION DETAILED: LEFT INFERIOR UPPER BACK

## 2024-02-08 ASSESSMENT — LOCATION ZONE DERM
LOCATION ZONE: TRUNK
LOCATION ZONE: ARM
LOCATION ZONE: NECK
LOCATION ZONE: LEG
LOCATION ZONE: FACE

## 2024-02-08 ASSESSMENT — LOCATION SIMPLE DESCRIPTION DERM
LOCATION SIMPLE: RIGHT LOWER BACK
LOCATION SIMPLE: RIGHT POSTERIOR UPPER ARM
LOCATION SIMPLE: LEFT UPPER BACK
LOCATION SIMPLE: LEFT CALF
LOCATION SIMPLE: LEFT POPLITEAL SKIN
LOCATION SIMPLE: RIGHT FOREARM
LOCATION SIMPLE: RIGHT POSTERIOR THIGH
LOCATION SIMPLE: RIGHT KNEE
LOCATION SIMPLE: LEFT POSTERIOR THIGH
LOCATION SIMPLE: RIGHT THIGH
LOCATION SIMPLE: RIGHT CHEEK
LOCATION SIMPLE: RIGHT BACK
LOCATION SIMPLE: LEFT POSTERIOR UPPER ARM
LOCATION SIMPLE: LEFT FOREARM
LOCATION SIMPLE: LEFT TEMPLE
LOCATION SIMPLE: RIGHT CALF
LOCATION SIMPLE: RIGHT BREAST
LOCATION SIMPLE: RIGHT PRETIBIAL REGION
LOCATION SIMPLE: LEFT PRETIBIAL REGION
LOCATION SIMPLE: ABDOMEN
LOCATION SIMPLE: POSTERIOR NECK
LOCATION SIMPLE: SUPERIOR FOREHEAD
LOCATION SIMPLE: RIGHT UPPER ARM
LOCATION SIMPLE: NECK
LOCATION SIMPLE: RIGHT UPPER BACK
LOCATION SIMPLE: LEFT SHOULDER
LOCATION SIMPLE: LEFT THIGH
LOCATION SIMPLE: LEFT LOWER BACK
LOCATION SIMPLE: CHEST
LOCATION SIMPLE: RIGHT SHOULDER
LOCATION SIMPLE: LEFT CHEEK
LOCATION SIMPLE: UPPER BACK

## 2024-02-08 NOTE — PROCEDURE: LIQUID NITROGEN
Render Post-Care Instructions In Note?: yes
Number Of Freeze-Thaw Cycles: 2 freeze-thaw cycles
Post-Care Instructions: I reviewed with the patient in detail post-care instructions. Patient is to wear sunprotection, and avoid picking at any of the treated lesions. Pt may apply Vaseline to crusted or scabbing areas.
Consent: The patient's consent was obtained including but not limited to risks of crusting, scabbing, blistering, scarring, darker or lighter pigmentary change, recurrence, incomplete removal and infection.
Detail Level: Detailed
Duration Of Freeze Thaw-Cycle (Seconds): 10
Render Note In Bullet Format When Appropriate: No
Application Tool (Optional): Liquid Nitrogen Sprayer

## 2024-05-03 ENCOUNTER — TELEPHONE (OUTPATIENT)
Dept: NEUROLOGY | Facility: CLINIC | Age: 74
End: 2024-05-03

## 2024-05-08 NOTE — PROGRESS NOTES
Patient ID: Makenzie Ellis is a 74 y.o. female.    Assessment/Plan:    Dizziness and giddiness  She continues to have symptoms of dizziness which she describes as not being able to think well and feeling off balance.  In the past, she did have mild orthostasis on exam.  She follows closely with cardiology in [place].  Exam is not consistent with Parkinson's.  She had some relief with acupuncture.    Recommendations:  -She is to resume acupuncture this week closer to home  -She requested a prescription for meclizine which I forgot to give her during the office visit but I will send to the pharmacy.    Cervicogenic headache  At last visit, she was having significant headaches.  We tried her on gabapentin but she did not tolerate it.  Her  notes it made her more dizzy and she stopped it.  Fortunately, the headaches have resolved and she has not needed medication.    Cervical spinal stenosis  Her reflexes remain slightly brisk.  No sensory loss noted today.  She has had stress incontinence.  She is not weak.  MRI cervical spine did show moderate cervical spinal stenosis and foraminal stenosis.     Anxiety disorder  She has anxiety.  She does feel like the escitalopram helps but feels like the dose needs to be increased.  This was started by her original neurologist, Dr. Linden Norton.  She is on the maximum dose of escitalopram.      Her  suggested she try acupuncture first and then determine if she still needs an increase in the SSRI.  She is agreeable to this plan.  I think this is a good idea.     Family history of Parkinsons:  Her brother has PD. Patient has some tremor but is not Parkinsonian on exam.  She has a subtle chin tremor and postural tremor with mild tremor on finger-nose.        Follow-up as needed.    I have spent a total time of 38 minutes on 05/09/24 in caring for this patient including Diagnostic results, Instructions for management, Patient and family education, Importance of tx  compliance, Risk factor reductions, Impressions, Counseling / Coordination of care, Documenting in the medical record, Reviewing / ordering tests, medicine, procedures  , and Obtaining or reviewing history  .          Subjective:    Mrs Ellis is a 74-year-old right handed lady with PMH HTN, HLD, chronic vertigo/dizziness, mitral valve prolapse, here for follow up of dizziness and vestibular migraine.. Last seen in Oct 2023. She was referred by Dr Villatoro. She is here with her .    She has lightheadedness. She also has dizziness which she say is when she cannot think straight and her balance is off. Both occur daily. She had a good day on Tuesday.     The dizziness worsens with changes in barometric pressure.  After flying home from [place], she did not feel well for several days.    She was hospitalized at [facility] since last visit. She had bronchial infection. She required antibiotics and oxygen. She went into A fib for 12 hours. She was about to have a nuclear stress test but the stress test machine was not functioning.  Her cardiologist called during this appointment and the test will be rescheduled.      She tried acupuncture in [place] for 2 months. She was walking in the pool daily for 2 hours. She felt well for about 4-6 weeks.    She still takes meclizine. She has been getting it over the counter. It has been expensive.     She has occasional tremor of the hand. No tremor of head or voice. Her brother has Parkinsons. Tremor can occur when she is anxious or tired.     She stopped the gabapentin because her  notes it makes her more dizzy.  She was not having any more headaches.    In the past, she saw Dr. Linden Norton in [place].  He started her on escitalopram.  She wonders if she needs a higher dose, but she is already on the max.  This has been helpful with her anxiety.    She underwent MRI cervical spine in the past which showed moderate spinal stenosis and foraminal stenosis.      She  attended vestibular therapy in the past which did help with her balance.     She does not drink coffee. She drinks about 36 oz of water per day.            Objective:    Blood pressure 112/74, pulse 82, temperature 98.1 °F (36.7 °C), temperature source Temporal, weight 97.9 kg (215 lb 12.8 oz), SpO2 98%.    Physical Exam  Constitutional:       Appearance: Normal appearance.   HENT:      Mouth/Throat:      Mouth: Mucous membranes are moist.      Pharynx: Oropharynx is clear.   Eyes:      Conjunctiva/sclera: Conjunctivae normal.   Cardiovascular:      Rate and Rhythm: Normal rate and regular rhythm.   Pulmonary:      Effort: Pulmonary effort is normal.      Breath sounds: Normal breath sounds.   Skin:     General: Skin is warm and dry.   Psychiatric:         Mood and Affect: Mood normal.         Behavior: Behavior normal.         Neurological Exam  Mental status: Awake, alert, oriented to person, place and time.  Naming, knowledge, repetition, concentration and naming intact.     Cranial nerves: Extraocular movements intact.  Pupils equally round and reactive to light.  Visual fields full to confrontation.  Facial sensation intact.  Face symmetric.  No nystagmus noted today.  Hearing intact.  Tongue, uvula, palate midline and intact.  Sternocleidomastoid intact.     Motor: Strength 5/5 in all 4 extremities.  Normal tone.  Rapid alternating movements symmetric.  Postural tremor. No resting tremor.  She has a subtle chin tremor but no head tremor.     Cerebellar: No dysmetria noted today.  Heel-to-shin intact.  Gait is slightly wide.  No shuffling.  She is able to walk more independently today than at her last visit.     Reflexes: 2+ in the right arm, 2+ in the left arm, 2+ at the knees and absent at the ankles.  Decreased threshold in the upper extremities.      Sensory: Light touch intact.  Temperature reduced in the left hand and leg.  Vibration 7 seconds on the right and 11 seconds at the left great toe.       ROS:    Review of Systems  Constitutional:  Negative for appetite change, fatigue and fever.   HENT: Negative.  Negative for hearing loss, tinnitus, trouble swallowing and voice change.    Eyes: Negative.  Negative for photophobia, pain and visual disturbance.   Respiratory: Negative.  Negative for shortness of breath.    Cardiovascular: Negative.  Negative for palpitations.   Gastrointestinal: Negative.  Negative for nausea and vomiting.   Endocrine: Negative.  Negative for cold intolerance.   Genitourinary: Negative.  Negative for dysuria, frequency and urgency.   Musculoskeletal:  Negative for back pain, gait problem, myalgias, neck pain and neck stiffness.   Skin: Negative.  Negative for rash.   Allergic/Immunologic: Negative.    Neurological:  Positive for dizziness (ongoing) and headaches (not as bad). Negative for tremors, seizures, syncope, facial asymmetry, speech difficulty, weakness, light-headedness and numbness.   Hematological: Negative.  Does not bruise/bleed easily.   Psychiatric/Behavioral: Negative.  Negative for confusion, hallucinations and sleep disturbance.           DATA:  Carotid ultrasound October 2023:  This study was done at [facility]  No internal or external carotid stenosis  Mild plaque in the carotid arteries bilaterally  Antegrade flow in both vertebral arteries

## 2024-05-09 ENCOUNTER — OFFICE VISIT (OUTPATIENT)
Dept: NEUROLOGY | Facility: CLINIC | Age: 74
End: 2024-05-09
Payer: MEDICARE

## 2024-05-09 VITALS
HEART RATE: 82 BPM | BODY MASS INDEX: 34.83 KG/M2 | TEMPERATURE: 98.1 F | SYSTOLIC BLOOD PRESSURE: 112 MMHG | DIASTOLIC BLOOD PRESSURE: 74 MMHG | WEIGHT: 215.8 LBS | OXYGEN SATURATION: 98 %

## 2024-05-09 DIAGNOSIS — G44.86 CERVICOGENIC HEADACHE: ICD-10-CM

## 2024-05-09 DIAGNOSIS — F41.9 ANXIETY DISORDER: ICD-10-CM

## 2024-05-09 DIAGNOSIS — R42 DIZZINESS AND GIDDINESS: Primary | ICD-10-CM

## 2024-05-09 DIAGNOSIS — M48.02 CERVICAL SPINAL STENOSIS: ICD-10-CM

## 2024-05-09 PROCEDURE — 99214 OFFICE O/P EST MOD 30 MIN: CPT | Performed by: PSYCHIATRY & NEUROLOGY

## 2024-05-09 RX ORDER — AMLODIPINE BESYLATE 2.5 MG/1
2.5 TABLET ORAL DAILY
COMMUNITY
Start: 2024-02-24

## 2024-05-09 RX ORDER — MECLIZINE HYDROCHLORIDE 25 MG/1
25 TABLET ORAL 3 TIMES DAILY PRN
Qty: 90 TABLET | Refills: 5 | Status: SHIPPED | OUTPATIENT
Start: 2024-05-09

## 2024-05-09 NOTE — PROGRESS NOTES
Patient ID: Makenzie Ellis is a 74 y.o. female.    Assessment/Plan:    Dizziness and giddiness  She continues to have symptoms of dizziness which she describes as not being able to think well and feeling off balance.  In the past, she did have mild orthostasis on exam.  She follows closely with cardiology in [place].  Exam is not consistent with Parkinson's.  She had some relief with acupuncture.    Recommendations:  -She is to resume acupuncture this week closer to home  -She requested a prescription for meclizine which I forgot to give her during the office visit but I will send to the pharmacy.    Cervicogenic headache  At last visit, she was having significant headaches.  We tried her on gabapentin but she did not tolerate it.  Her  notes it made her more dizzy and she stopped it.  Fortunately, the headaches have resolved and she has not needed medication.    Cervical spinal stenosis  Her reflexes remain slightly brisk.  No sensory loss noted today.  She has had stress incontinence.  She is not weak.  MRI cervical spine did show moderate cervical spinal stenosis and foraminal stenosis.     Anxiety disorder  She has anxiety.  She does feel like the escitalopram helps but feels like the dose needs to be increased.  This was started by her original neurologist, Dr. Linden Norton.  She is on the maximum dose of escitalopram.      Her  suggested she try acupuncture first and then determine if she still needs an increase in the SSRI.  She is agreeable to this plan.  I think this is a good idea.       {Assess/PlanSmartLinks:20510}       Subjective:    HPI    {[facility] Neurology HPI texts:31167}    {Common ambulatory SmartLinks:00352}         Objective:    Blood pressure 112/74, pulse 82, temperature 98.1 °F (36.7 °C), temperature source Temporal, weight 97.9 kg (215 lb 12.8 oz), SpO2 98%.    Physical Exam    Neurological Exam      ROS:    Review of Systems   Constitutional:  Negative for appetite change,  fatigue and fever.   HENT: Negative.  Negative for hearing loss, tinnitus, trouble swallowing and voice change.    Eyes: Negative.  Negative for photophobia, pain and visual disturbance.   Respiratory: Negative.  Negative for shortness of breath.    Cardiovascular: Negative.  Negative for palpitations.   Gastrointestinal: Negative.  Negative for nausea and vomiting.   Endocrine: Negative.  Negative for cold intolerance.   Genitourinary: Negative.  Negative for dysuria, frequency and urgency.   Musculoskeletal:  Negative for back pain, gait problem, myalgias, neck pain and neck stiffness.   Skin: Negative.  Negative for rash.   Allergic/Immunologic: Negative.    Neurological:  Positive for dizziness (ongoing) and headaches (not as bad). Negative for tremors, seizures, syncope, facial asymmetry, speech difficulty, weakness, light-headedness and numbness.   Hematological: Negative.  Does not bruise/bleed easily.   Psychiatric/Behavioral: Negative.  Negative for confusion, hallucinations and sleep disturbance.

## 2024-05-09 NOTE — ASSESSMENT & PLAN NOTE
She continues to have symptoms of dizziness which she describes as not being able to think well and feeling off balance.  In the past, she did have mild orthostasis on exam.  She follows closely with cardiology in [place].  Exam is not consistent with Parkinson's.  She had some relief with acupuncture.    Recommendations:  -She is to resume acupuncture this week closer to home  -She requested a prescription for meclizine which I forgot to give her during the office visit but I will send to the pharmacy.

## 2024-05-09 NOTE — ASSESSMENT & PLAN NOTE
At last visit, she was having significant headaches.  We tried her on gabapentin but she did not tolerate it.  Her  notes it made her more dizzy and she stopped it.  Fortunately, the headaches have resolved and she has not needed medication.

## 2024-05-09 NOTE — ASSESSMENT & PLAN NOTE
Her reflexes remain slightly brisk.  No sensory loss noted today.  She has had stress incontinence.  She is not weak.  MRI cervical spine did show moderate cervical spinal stenosis and foraminal stenosis.

## 2024-05-09 NOTE — ASSESSMENT & PLAN NOTE
She has anxiety.  She does feel like the escitalopram helps but feels like the dose needs to be increased.  This was started by her original neurologist, Dr. Linden Norton.  She is on the maximum dose of escitalopram.      Her  suggested she try acupuncture first and then determine if she still needs an increase in the SSRI.  She is agreeable to this plan.  I think this is a good idea.

## 2024-07-05 DIAGNOSIS — F32.A DEPRESSED: Primary | ICD-10-CM

## 2024-07-05 NOTE — PROGRESS NOTES
Patient c/o depressed due to persistent dizziness.     She requested script to see psychology.     Script written. Patient informed.

## 2024-07-22 ENCOUNTER — TELEPHONE (OUTPATIENT)
Age: 74
End: 2024-07-22

## 2024-07-22 NOTE — TELEPHONE ENCOUNTER
Pt returned call to be added to wait list for talk therapy. Pt has been added to proper waitlist for talk therapy.

## 2025-01-21 ENCOUNTER — TELEPHONE (OUTPATIENT)
Age: 75
End: 2025-01-21

## 2025-01-21 NOTE — TELEPHONE ENCOUNTER
Patient on wait list for talk therapy services.  Called Pt to offer an appt. No answer, lvm for Pt to call back SL at [phone], opt 3.    1st attempt.

## 2025-01-25 ENCOUNTER — APPOINTMENT (OUTPATIENT)
Dept: URBAN - METROPOLITAN AREA CLINIC 150 | Facility: CLINIC | Age: 75
Setting detail: DERMATOLOGY
End: 2025-01-25

## 2025-01-25 DIAGNOSIS — Z85.828 PERSONAL HISTORY OF OTHER MALIGNANT NEOPLASM OF SKIN: ICD-10-CM

## 2025-01-25 DIAGNOSIS — D22 MELANOCYTIC NEVI: ICD-10-CM

## 2025-01-25 DIAGNOSIS — L82.0 INFLAMED SEBORRHEIC KERATOSIS: ICD-10-CM

## 2025-01-25 DIAGNOSIS — L81.4 OTHER MELANIN HYPERPIGMENTATION: ICD-10-CM

## 2025-01-25 DIAGNOSIS — L82.1 OTHER SEBORRHEIC KERATOSIS: ICD-10-CM

## 2025-01-25 PROBLEM — D22.5 MELANOCYTIC NEVI OF TRUNK: Status: ACTIVE | Noted: 2025-01-25

## 2025-01-25 PROBLEM — D48.5 NEOPLASM OF UNCERTAIN BEHAVIOR OF SKIN: Status: ACTIVE | Noted: 2025-01-25

## 2025-01-25 PROCEDURE — 11103 TANGNTL BX SKIN EA SEP/ADDL: CPT | Mod: 59

## 2025-01-25 PROCEDURE — ? FULL BODY SKIN EXAM

## 2025-01-25 PROCEDURE — ? LIQUID NITROGEN

## 2025-01-25 PROCEDURE — ? TREATMENT REGIMEN

## 2025-01-25 PROCEDURE — 99213 OFFICE O/P EST LOW 20 MIN: CPT | Mod: 25

## 2025-01-25 PROCEDURE — ? BIOPSY BY SHAVE METHOD

## 2025-01-25 PROCEDURE — ? COUNSELING

## 2025-01-25 PROCEDURE — 17110 DESTRUCTION B9 LES UP TO 14: CPT

## 2025-01-25 PROCEDURE — ? MEDICARE ABN

## 2025-01-25 PROCEDURE — ? PHOTO-DOCUMENTATION

## 2025-01-25 PROCEDURE — 11102 TANGNTL BX SKIN SINGLE LES: CPT | Mod: 59

## 2025-01-25 ASSESSMENT — LOCATION DETAILED DESCRIPTION DERM
LOCATION DETAILED: RIGHT SUPERIOR LATERAL BUCCAL CHEEK
LOCATION DETAILED: INFERIOR THORACIC SPINE
LOCATION DETAILED: MIDDLE STERNUM
LOCATION DETAILED: LEFT INFERIOR LATERAL NECK
LOCATION DETAILED: RIGHT SUPERIOR CENTRAL BUCCAL CHEEK
LOCATION DETAILED: RIGHT SUPERIOR MEDIAL UPPER BACK

## 2025-01-25 ASSESSMENT — LOCATION SIMPLE DESCRIPTION DERM
LOCATION SIMPLE: LEFT ANTERIOR NECK
LOCATION SIMPLE: RIGHT CHEEK
LOCATION SIMPLE: UPPER BACK
LOCATION SIMPLE: RIGHT UPPER BACK
LOCATION SIMPLE: CHEST

## 2025-01-25 ASSESSMENT — LOCATION ZONE DERM
LOCATION ZONE: FACE
LOCATION ZONE: NECK
LOCATION ZONE: TRUNK

## 2025-01-25 NOTE — PROCEDURE: MEDICARE ABN
Payment Option: Option 1: Bill Medicare, await for decision on payment.
Reason?: Additional Information
Procedure (Limit To 20 Characters): brace
Detail Level: Detailed
Reason?: non-covered service

## 2025-01-25 NOTE — PROCEDURE: LIQUID NITROGEN
Add 52 Modifier (Optional): no
Show Applicator Variable?: Yes
Spray Paint Text: The liquid nitrogen was applied to the skin utilizing a spray paint frosting technique.
Medical Necessity Information: It is in your best interest to select a reason for this procedure from the list below. All of these items fulfill various CMS LCD requirements except the new and changing color options.
Consent: The patient's consent was obtained including but not limited to risks of crusting, scabbing, blistering, scarring, darker or lighter pigmentary change, recurrence, incomplete removal and infection.
Medical Necessity Clause: This procedure was medically necessary because the lesions that were treated were:
Detail Level: Detailed
Post-Care Instructions: Liquid Nitrogen (Freezing or Cryotherapy): This involves having a very cold spray applied to the lesion. This can be painful (topical anesthetic can be applied before or after treatment). After treatment, the area may form into a blister. The blister can be popped with a sterile needle, and covered with a dry bandage. You can resume normal activity; it is fine to get the area wet. It generally takes 4-6 treatments for the wart to resolve, treatments are done 2-3 weeks apart.